# Patient Record
Sex: MALE | Race: WHITE | NOT HISPANIC OR LATINO | Employment: OTHER | URBAN - METROPOLITAN AREA
[De-identification: names, ages, dates, MRNs, and addresses within clinical notes are randomized per-mention and may not be internally consistent; named-entity substitution may affect disease eponyms.]

---

## 2023-11-05 DIAGNOSIS — B35.4 TINEA CORPORIS: ICD-10-CM

## 2023-11-07 PROBLEM — E11.9 DIABETES MELLITUS (MULTI): Status: ACTIVE | Noted: 2023-11-07

## 2023-11-07 PROBLEM — I10 BENIGN ESSENTIAL HYPERTENSION: Status: ACTIVE | Noted: 2023-11-07

## 2023-11-07 PROBLEM — M10.9 GOUT: Status: ACTIVE | Noted: 2023-11-07

## 2023-11-07 PROBLEM — N25.81 HYPERPARATHYROIDISM, SECONDARY RENAL (MULTI): Status: ACTIVE | Noted: 2023-11-07

## 2023-11-07 PROBLEM — B35.4 TINEA CORPORIS: Status: ACTIVE | Noted: 2023-11-07

## 2023-11-07 PROBLEM — E04.1 THYROID NODULE: Status: ACTIVE | Noted: 2023-11-07

## 2023-11-07 PROBLEM — N18.30 CKD (CHRONIC KIDNEY DISEASE), STAGE III (MULTI): Status: ACTIVE | Noted: 2023-11-07

## 2023-11-07 PROBLEM — R07.89 ATYPICAL CHEST PAIN: Status: ACTIVE | Noted: 2023-11-07

## 2023-11-07 PROBLEM — E78.00 ELEVATED LDL CHOLESTEROL LEVEL: Status: ACTIVE | Noted: 2023-11-07

## 2023-11-07 PROBLEM — M54.50 LUMBAGO: Status: ACTIVE | Noted: 2023-11-07

## 2023-11-07 RX ORDER — AMLODIPINE AND BENAZEPRIL HYDROCHLORIDE 10; 20 MG/1; MG/1
1 CAPSULE ORAL DAILY
COMMUNITY
End: 2024-01-12

## 2023-11-07 RX ORDER — ATORVASTATIN CALCIUM 20 MG/1
1 TABLET, FILM COATED ORAL DAILY
COMMUNITY
Start: 2012-10-22 | End: 2024-02-28 | Stop reason: SDUPTHER

## 2023-11-07 RX ORDER — PARICALCITOL 1 UG/1
CAPSULE, LIQUID FILLED ORAL
COMMUNITY
Start: 2021-10-25

## 2023-11-07 RX ORDER — BENAZEPRIL HYDROCHLORIDE 40 MG/1
TABLET ORAL
COMMUNITY
Start: 2023-09-25

## 2023-11-07 RX ORDER — SILVER SULFADIAZINE 10 G/1000G
CREAM TOPICAL
COMMUNITY
Start: 2023-03-06

## 2023-11-07 RX ORDER — NYSTATIN 100000 U/G
CREAM TOPICAL
COMMUNITY
Start: 2023-10-08 | End: 2023-12-06 | Stop reason: WASHOUT

## 2023-11-07 RX ORDER — CHOLECALCIFEROL (VITAMIN D3) 50 MCG
1 TABLET ORAL DAILY
COMMUNITY
Start: 2018-10-24

## 2023-11-07 RX ORDER — IBUPROFEN 100 MG/5ML
1 SUSPENSION, ORAL (FINAL DOSE FORM) ORAL DAILY
COMMUNITY
Start: 2018-10-24

## 2023-11-07 RX ORDER — ALLOPURINOL 300 MG/1
1 TABLET ORAL DAILY
COMMUNITY
Start: 2013-04-29

## 2023-11-07 RX ORDER — TERAZOSIN 10 MG/1
CAPSULE ORAL
COMMUNITY
Start: 2023-10-19

## 2023-11-07 RX ORDER — TORSEMIDE 20 MG/1
TABLET ORAL
COMMUNITY
Start: 2023-10-10 | End: 2024-06-05 | Stop reason: SDUPTHER

## 2023-11-08 RX ORDER — NYSTATIN 100000 U/G
CREAM TOPICAL
Qty: 30 G | Refills: 3 | Status: SHIPPED | OUTPATIENT
Start: 2023-11-08

## 2023-12-06 ENCOUNTER — OFFICE VISIT (OUTPATIENT)
Dept: PRIMARY CARE | Facility: CLINIC | Age: 78
End: 2023-12-06
Payer: MEDICARE

## 2023-12-06 VITALS
HEIGHT: 71 IN | DIASTOLIC BLOOD PRESSURE: 80 MMHG | TEMPERATURE: 98 F | BODY MASS INDEX: 35 KG/M2 | HEART RATE: 78 BPM | WEIGHT: 250 LBS | SYSTOLIC BLOOD PRESSURE: 137 MMHG

## 2023-12-06 DIAGNOSIS — N18.30 TYPE 2 DIABETES MELLITUS WITH STAGE 3 CHRONIC KIDNEY DISEASE, WITHOUT LONG-TERM CURRENT USE OF INSULIN, UNSPECIFIED WHETHER STAGE 3A OR 3B CKD (MULTI): Primary | ICD-10-CM

## 2023-12-06 DIAGNOSIS — I10 BENIGN ESSENTIAL HYPERTENSION: ICD-10-CM

## 2023-12-06 DIAGNOSIS — M10.9 GOUT, UNSPECIFIED CAUSE, UNSPECIFIED CHRONICITY, UNSPECIFIED SITE: ICD-10-CM

## 2023-12-06 DIAGNOSIS — E78.00 ELEVATED LDL CHOLESTEROL LEVEL: ICD-10-CM

## 2023-12-06 DIAGNOSIS — Z23 ENCOUNTER FOR IMMUNIZATION: ICD-10-CM

## 2023-12-06 DIAGNOSIS — E11.22 TYPE 2 DIABETES MELLITUS WITH STAGE 3 CHRONIC KIDNEY DISEASE, WITHOUT LONG-TERM CURRENT USE OF INSULIN, UNSPECIFIED WHETHER STAGE 3A OR 3B CKD (MULTI): Primary | ICD-10-CM

## 2023-12-06 DIAGNOSIS — N18.31 STAGE 3A CHRONIC KIDNEY DISEASE (MULTI): ICD-10-CM

## 2023-12-06 PROBLEM — K63.5 COLON POLYPS: Status: ACTIVE | Noted: 2023-12-06

## 2023-12-06 PROBLEM — M19.90 ARTHRITIS: Status: ACTIVE | Noted: 2023-12-06

## 2023-12-06 PROBLEM — K82.9 DISORDER OF GALLBLADDER: Status: ACTIVE | Noted: 2023-12-06

## 2023-12-06 PROBLEM — S83.92XA LEFT KNEE SPRAIN: Status: ACTIVE | Noted: 2023-12-06

## 2023-12-06 PROBLEM — D64.9 ANEMIA: Status: ACTIVE | Noted: 2023-12-06

## 2023-12-06 PROBLEM — I49.9 IRREGULAR HEART RHYTHM: Status: ACTIVE | Noted: 2023-12-06

## 2023-12-06 PROCEDURE — 1036F TOBACCO NON-USER: CPT | Performed by: INTERNAL MEDICINE

## 2023-12-06 PROCEDURE — 3079F DIAST BP 80-89 MM HG: CPT | Performed by: INTERNAL MEDICINE

## 2023-12-06 PROCEDURE — 1160F RVW MEDS BY RX/DR IN RCRD: CPT | Performed by: INTERNAL MEDICINE

## 2023-12-06 PROCEDURE — 3075F SYST BP GE 130 - 139MM HG: CPT | Performed by: INTERNAL MEDICINE

## 2023-12-06 PROCEDURE — 90677 PCV20 VACCINE IM: CPT | Performed by: INTERNAL MEDICINE

## 2023-12-06 PROCEDURE — 99213 OFFICE O/P EST LOW 20 MIN: CPT | Performed by: INTERNAL MEDICINE

## 2023-12-06 PROCEDURE — G0009 ADMIN PNEUMOCOCCAL VACCINE: HCPCS | Performed by: INTERNAL MEDICINE

## 2023-12-06 PROCEDURE — 1159F MED LIST DOCD IN RCRD: CPT | Performed by: INTERNAL MEDICINE

## 2023-12-06 ASSESSMENT — ENCOUNTER SYMPTOMS
DYSURIA: 0
LOSS OF SENSATION IN FEET: 0
DIZZINESS: 0
DIARRHEA: 0
CONFUSION: 0
VOMITING: 0
SHORTNESS OF BREATH: 0
OCCASIONAL FEELINGS OF UNSTEADINESS: 1
AGITATION: 0
COUGH: 0
FEVER: 0
SORE THROAT: 0
LIGHT-HEADEDNESS: 0
CHILLS: 0
ABDOMINAL PAIN: 0
NAUSEA: 0
DEPRESSION: 0

## 2023-12-06 ASSESSMENT — PATIENT HEALTH QUESTIONNAIRE - PHQ9
1. LITTLE INTEREST OR PLEASURE IN DOING THINGS: NOT AT ALL
2. FEELING DOWN, DEPRESSED OR HOPELESS: NOT AT ALL
SUM OF ALL RESPONSES TO PHQ9 QUESTIONS 1 AND 2: 0

## 2023-12-06 NOTE — PROGRESS NOTES
"Subjective   Patient ID: Rajeev Gonzalez Jr. is a 78 y.o. male who presents for Establish Care.    HPI Patient sees Dr. Lozada kidney specialist for management of CKD stage 3. Patient sees an eye Doctor once a year and reports no history of diabetic retinopathy.  Patient due for blood work.  States he has blood work ordered by nephrologist.  Patient up-to-date on age and gender recommended vaccinations with exception of a pneumonia vaccine which will be given today.  We also discussed that he will be due for colonoscopy and he should contact his gastroenterologist Dr. Abdifatah Chau to discuss this further.  Patient states he does have contact information for his GI doctor as his wife also sees the same doctor.  Patient denies any fever, chills, chest pain or shortness of breath, nausea, vomit diarrhea, abdominal pain.    Review of Systems   Constitutional:  Negative for chills and fever.   HENT:  Negative for sore throat.    Eyes:  Negative for visual disturbance.   Respiratory:  Negative for cough and shortness of breath.    Cardiovascular:  Positive for leg swelling (chronic stable). Negative for chest pain.   Gastrointestinal:  Negative for abdominal pain, diarrhea, nausea and vomiting.   Genitourinary:  Negative for dysuria.   Skin:  Negative for rash.   Neurological:  Negative for dizziness, syncope and light-headedness.   Psychiatric/Behavioral:  Negative for agitation and confusion.        Objective   /80   Pulse 78   Temp 36.7 °C (98 °F)   Ht 1.803 m (5' 11\")   Wt 113 kg (250 lb)   BMI 34.87 kg/m²     Physical Exam  Vitals and nursing note reviewed.   Constitutional:       General: He is not in acute distress.     Appearance: Normal appearance. He is obese. He is not ill-appearing, toxic-appearing or diaphoretic.   HENT:      Head: Normocephalic and atraumatic.      Mouth/Throat:      Mouth: Mucous membranes are moist.      Pharynx: Oropharynx is clear. No oropharyngeal exudate.   Eyes:      Pupils: " Pupils are equal, round, and reactive to light.   Cardiovascular:      Rate and Rhythm: Normal rate and regular rhythm.      Heart sounds: Normal heart sounds.   Pulmonary:      Effort: Pulmonary effort is normal. No respiratory distress.      Breath sounds: Normal breath sounds. No wheezing or rales.   Abdominal:      General: Bowel sounds are normal. There is no distension.      Palpations: Abdomen is soft. There is no mass.      Tenderness: There is no abdominal tenderness.   Musculoskeletal:      Cervical back: Neck supple. No tenderness.   Skin:     General: Skin is warm and dry.   Neurological:      General: No focal deficit present.      Mental Status: He is alert and oriented to person, place, and time.   Psychiatric:         Mood and Affect: Mood normal.         Behavior: Behavior normal.         Thought Content: Thought content normal.         Judgment: Judgment normal.         Assessment/Plan   Problem List Items Addressed This Visit             ICD-10-CM    Benign essential hypertension I10    CKD (chronic kidney disease), stage III (CMS/[facility]) N18.30    Relevant Orders    Vitamin D 25-Hydroxy,Total (for eval of Vitamin D levels)    Diabetes mellitus (CMS/[facility]) - Primary E11.9    Relevant Orders    Comprehensive metabolic panel    Hemoglobin A1C    Elevated LDL cholesterol level E78.00    Relevant Orders    Lipid panel    Gout M10.9    Relevant Orders    Uric acid    Encounter for immunization Z23    Relevant Orders    Pneumococcal conjugate vaccine, 20-valent (PREVNAR 20)     Diabetes mellitus: Chronic, stable the patient's last hemoglobin A1c was reviewed today with him and it was 6.2% from May 22, 2023.  We will repeat hemoglobin A1c and CMP at this time.  Currently managed by dietary and exercise patient currently not on diabetes medication    CKD stage III: Chronic, stable patient following with nephrology.  States that he has labs to do for his nephrologist as well.     Hyperlipidemia: Chronic, stable  we will recheck a lipid panel    Gout: We will recheck uric acid continue allopurinol    Encounter for immunization: We will give the patient the pneumonia vaccine Prevnar 20 today    Hypertension: Chronic, stable continue amlodipine benazepril

## 2024-01-09 DIAGNOSIS — I10 ESSENTIAL (PRIMARY) HYPERTENSION: ICD-10-CM

## 2024-01-12 RX ORDER — AMLODIPINE AND BENAZEPRIL HYDROCHLORIDE 10; 20 MG/1; MG/1
1 CAPSULE ORAL DAILY
Qty: 90 CAPSULE | Refills: 1 | Status: SHIPPED | OUTPATIENT
Start: 2024-01-12 | End: 2024-06-05 | Stop reason: ALTCHOICE

## 2024-02-28 DIAGNOSIS — E78.00 ELEVATED LDL CHOLESTEROL LEVEL: ICD-10-CM

## 2024-03-05 RX ORDER — ATORVASTATIN CALCIUM 20 MG/1
20 TABLET, FILM COATED ORAL DAILY
Qty: 90 TABLET | Refills: 3 | Status: SHIPPED | OUTPATIENT
Start: 2024-03-05

## 2024-06-04 PROBLEM — M62.81 QUADRICEPS WEAKNESS: Status: ACTIVE | Noted: 2024-06-04

## 2024-06-04 PROBLEM — R55 SYNCOPE: Status: ACTIVE | Noted: 2024-06-04

## 2024-06-04 PROBLEM — H90.3 SENSORINEURAL HEARING LOSS (SNHL) OF BOTH EARS: Status: ACTIVE | Noted: 2024-06-04

## 2024-06-04 PROBLEM — I50.9 CONGESTIVE HEART FAILURE (MULTI): Status: ACTIVE | Noted: 2024-06-04

## 2024-06-04 PROBLEM — E55.9 VITAMIN D DEFICIENCY: Status: ACTIVE | Noted: 2024-06-04

## 2024-06-04 PROBLEM — E66.9 OBESITY: Status: ACTIVE | Noted: 2024-06-04

## 2024-06-04 PROBLEM — M21.379 FOOT DROP: Status: ACTIVE | Noted: 2024-06-04

## 2024-06-04 PROBLEM — I87.2 PERIPHERAL VENOUS INSUFFICIENCY: Status: ACTIVE | Noted: 2024-06-04

## 2024-06-04 PROBLEM — L60.1 ONYCHOLYSIS: Status: ACTIVE | Noted: 2024-06-04

## 2024-06-04 PROBLEM — R26.89 ANTALGIC GAIT: Status: ACTIVE | Noted: 2024-06-04
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
## 2024-06-04 PROBLEM — L97.909 CHRONIC SKIN ULCER OF LOWER LEG (MULTI): Status: ACTIVE | Noted: 2024-06-04

## 2024-06-04 PROBLEM — M79.676 PAIN OF TOE: Status: ACTIVE | Noted: 2024-06-04

## 2024-06-05 ENCOUNTER — OFFICE VISIT (OUTPATIENT)
Dept: PRIMARY CARE | Facility: CLINIC | Age: 79
End: 2024-06-05
Payer: MEDICARE

## 2024-06-05 VITALS
HEIGHT: 71 IN | DIASTOLIC BLOOD PRESSURE: 81 MMHG | HEART RATE: 69 BPM | TEMPERATURE: 98 F | SYSTOLIC BLOOD PRESSURE: 151 MMHG | BODY MASS INDEX: 34.02 KG/M2 | WEIGHT: 243 LBS

## 2024-06-05 DIAGNOSIS — R60.9 EDEMA, UNSPECIFIED TYPE: ICD-10-CM

## 2024-06-05 DIAGNOSIS — E11.9 TYPE 2 DIABETES MELLITUS WITHOUT COMPLICATION, WITHOUT LONG-TERM CURRENT USE OF INSULIN (MULTI): ICD-10-CM

## 2024-06-05 DIAGNOSIS — I10 BENIGN ESSENTIAL HYPERTENSION: Primary | ICD-10-CM

## 2024-06-05 DIAGNOSIS — E78.2 MIXED HYPERLIPIDEMIA: ICD-10-CM

## 2024-06-05 DIAGNOSIS — I87.2 PERIPHERAL VENOUS INSUFFICIENCY: ICD-10-CM

## 2024-06-05 DIAGNOSIS — M1A.9XX0 CHRONIC GOUT WITHOUT TOPHUS, UNSPECIFIED CAUSE, UNSPECIFIED SITE: ICD-10-CM

## 2024-06-05 DIAGNOSIS — E55.9 VITAMIN D DEFICIENCY: ICD-10-CM

## 2024-06-05 DIAGNOSIS — N18.31 STAGE 3A CHRONIC KIDNEY DISEASE (MULTI): ICD-10-CM

## 2024-06-05 PROCEDURE — 3079F DIAST BP 80-89 MM HG: CPT | Performed by: INTERNAL MEDICINE

## 2024-06-05 PROCEDURE — 1160F RVW MEDS BY RX/DR IN RCRD: CPT | Performed by: INTERNAL MEDICINE

## 2024-06-05 PROCEDURE — 1036F TOBACCO NON-USER: CPT | Performed by: INTERNAL MEDICINE

## 2024-06-05 PROCEDURE — 3077F SYST BP >= 140 MM HG: CPT | Performed by: INTERNAL MEDICINE

## 2024-06-05 PROCEDURE — 99214 OFFICE O/P EST MOD 30 MIN: CPT | Performed by: INTERNAL MEDICINE

## 2024-06-05 PROCEDURE — 1159F MED LIST DOCD IN RCRD: CPT | Performed by: INTERNAL MEDICINE

## 2024-06-05 RX ORDER — TORSEMIDE 20 MG/1
20 TABLET ORAL DAILY
Qty: 90 TABLET | Refills: 1 | Status: SHIPPED | OUTPATIENT
Start: 2024-06-05 | End: 2024-12-02

## 2024-06-05 ASSESSMENT — ENCOUNTER SYMPTOMS
COUGH: 0
ABDOMINAL PAIN: 0
SORE THROAT: 0
FEVER: 0
LIGHT-HEADEDNESS: 0
SHORTNESS OF BREATH: 0
CHILLS: 0
DIZZINESS: 0
DIARRHEA: 0
VOMITING: 0
BLOOD IN STOOL: 0
DYSURIA: 0
NAUSEA: 0
PALPITATIONS: 0

## 2024-06-05 NOTE — PROGRESS NOTES
"Subjective   Patient ID: Rajeev Gonzalez Jr. is a 79 y.o. male who presents for Follow-up (6 month ) and Med Refill (Torsemide 20mg ).    HPI Patient does not check bp at home.  Blood pressure elevated in the office today last week his blood pressure was normal at his nephrologist appointment Which was reviewed here in the office today blood pressure at that appointment was 128/74.  Patient tolerating his medications well he is here for refills on torsemide.  Diet controlled diabetes.  He is due for blood work last blood work was in December and reviewed with him today in the office.  Last hemoglobin A1c was 6.4%.  I did review his office note with nephrology from 5/28/2024.  Patient also has a history of gout on allopurinol.  Recommend low-sodium diet for hypertension he wears compression stockings for chronic venous insufficiency.  His exemplar medication was discontinued by nephrology.    Review of Systems   Constitutional:  Negative for chills and fever.   HENT:  Negative for sore throat.    Eyes:  Negative for visual disturbance.   Respiratory:  Negative for cough and shortness of breath.    Cardiovascular:  Positive for leg swelling. Negative for chest pain and palpitations.   Gastrointestinal:  Negative for abdominal pain, blood in stool, diarrhea, nausea and vomiting.   Genitourinary:  Negative for dysuria.   Skin:  Negative for rash.   Neurological:  Negative for dizziness, syncope and light-headedness.   Psychiatric/Behavioral:  Negative for behavioral problems.        Objective   /81   Pulse 69   Temp 36.7 °C (98 °F) (Temporal)   Ht 1.803 m (5' 11\")   Wt 110 kg (243 lb)   BMI 33.89 kg/m²     Physical Exam  Vitals and nursing note reviewed.   Constitutional:       General: He is not in acute distress.     Appearance: Normal appearance. He is obese. He is not ill-appearing, toxic-appearing or diaphoretic.   HENT:      Head: Normocephalic and atraumatic.      Mouth/Throat:      Mouth: Mucous " membranes are moist.      Pharynx: Oropharynx is clear. No oropharyngeal exudate.   Eyes:      Extraocular Movements: Extraocular movements intact.      Pupils: Pupils are equal, round, and reactive to light.   Cardiovascular:      Rate and Rhythm: Normal rate and regular rhythm.      Heart sounds: Normal heart sounds.   Pulmonary:      Effort: Pulmonary effort is normal. No respiratory distress.      Breath sounds: Normal breath sounds. No wheezing or rales.   Abdominal:      General: There is no distension.      Palpations: Abdomen is soft.      Tenderness: There is no abdominal tenderness.   Musculoskeletal:      Cervical back: Neck supple. No tenderness.      Right lower leg: Edema present.      Left lower leg: Edema present.   Skin:     General: Skin is warm and dry.      Findings: No rash.   Neurological:      General: No focal deficit present.      Mental Status: He is alert. Mental status is at baseline.   Psychiatric:         Mood and Affect: Mood normal.         Behavior: Behavior normal.         Thought Content: Thought content normal.         Judgment: Judgment normal.         Assessment/Plan   Problem List Items Addressed This Visit             ICD-10-CM    Benign essential hypertension - Primary I10    Relevant Medications    torsemide (Demadex) 20 mg tablet    Other Relevant Orders    CBC and Auto Differential    Comprehensive metabolic panel    Stage 3 chronic kidney disease (Multi) N18.30    Relevant Orders    Uric acid    Diabetes mellitus (Multi) E11.9    Relevant Orders    Hemoglobin A1C    Gout M10.9    Peripheral venous insufficiency I87.2    Vitamin D deficiency E55.9    Relevant Orders    Vitamin D 25-Hydroxy,Total (for eval of Vitamin D levels)    Edema R60.9    Relevant Medications    torsemide (Demadex) 20 mg tablet    Other Relevant Orders    CBC and Auto Differential    Comprehensive metabolic panel    Mixed hyperlipidemia E78.2    Relevant Orders    Lipid panel     Hypertension:  Chronic, stable continue torsemide refills provided today he will also continue on losartan and Hytrin    Stage III chronic kidney disease: Chronic, stable refilled his medicine for torsemide is on max dose of benazepril we will continue follow-up with kidney specialist will repeat labs at this time    Diabetes mellitus: Chronic, stable diet and exercise modification recommended currently not on medication.  Will check a hemoglobin A1c    Gout: Chronic, stable continue allopurinol    Peripheral venous insufficiency: Chronic, stable continue compression stockings    Vitamin D deficiency: We will check a vitamin D level    Hyperlipidemia: Chronic, stable continue atorvastatin 20 mg at bedtime

## 2024-07-18 ENCOUNTER — OFFICE VISIT (OUTPATIENT)
Dept: PRIMARY CARE | Facility: CLINIC | Age: 79
End: 2024-07-18
Payer: MEDICARE

## 2024-07-18 VITALS
DIASTOLIC BLOOD PRESSURE: 80 MMHG | HEART RATE: 76 BPM | OXYGEN SATURATION: 97 % | HEIGHT: 71 IN | WEIGHT: 242 LBS | SYSTOLIC BLOOD PRESSURE: 142 MMHG | BODY MASS INDEX: 33.88 KG/M2

## 2024-07-18 DIAGNOSIS — L03.221 CELLULITIS, NECK: ICD-10-CM

## 2024-07-18 DIAGNOSIS — L08.9 INFECTED CYST OF SKIN: Primary | ICD-10-CM

## 2024-07-18 DIAGNOSIS — L72.9 INFECTED CYST OF SKIN: Primary | ICD-10-CM

## 2024-07-18 DIAGNOSIS — B35.4 TINEA CORPORIS: ICD-10-CM

## 2024-07-18 DIAGNOSIS — I10 BENIGN ESSENTIAL HYPERTENSION: ICD-10-CM

## 2024-07-18 PROCEDURE — 1159F MED LIST DOCD IN RCRD: CPT | Performed by: INTERNAL MEDICINE

## 2024-07-18 PROCEDURE — 1036F TOBACCO NON-USER: CPT | Performed by: INTERNAL MEDICINE

## 2024-07-18 PROCEDURE — 1123F ACP DISCUSS/DSCN MKR DOCD: CPT | Performed by: INTERNAL MEDICINE

## 2024-07-18 PROCEDURE — 1158F ADVNC CARE PLAN TLK DOCD: CPT | Performed by: INTERNAL MEDICINE

## 2024-07-18 PROCEDURE — 99213 OFFICE O/P EST LOW 20 MIN: CPT | Performed by: INTERNAL MEDICINE

## 2024-07-18 PROCEDURE — 3079F DIAST BP 80-89 MM HG: CPT | Performed by: INTERNAL MEDICINE

## 2024-07-18 PROCEDURE — 3077F SYST BP >= 140 MM HG: CPT | Performed by: INTERNAL MEDICINE

## 2024-07-18 RX ORDER — AMOXICILLIN AND CLAVULANATE POTASSIUM 875; 125 MG/1; MG/1
875 TABLET, FILM COATED ORAL 2 TIMES DAILY
Qty: 20 TABLET | Refills: 0 | Status: SHIPPED | OUTPATIENT
Start: 2024-07-18 | End: 2024-07-28

## 2024-07-18 ASSESSMENT — ENCOUNTER SYMPTOMS
LOSS OF SENSATION IN FEET: 0
OCCASIONAL FEELINGS OF UNSTEADINESS: 0
DEPRESSION: 0

## 2024-07-18 NOTE — PROGRESS NOTES
"Internal Medicine Outpatient Visit  Chief Complaint   Patient presents with    Cyst     Cyst on back of neck has gotten bigger, its been small for years but last 2 days it grew. Hurts and sensitive to touch.        HPI: Rajeev Gonzalez Jr. is of 79 y.o. who is here for Internal Visit for the following issues:  Patient is seen my office today with the pain and sudden increase in the size of the cyst which was present on the back of the neck for several years.  He has no fever or chills.  Denies any chest pain or palpitation.  Has no respiratory symptoms.  Review of other systems are negative.    Past Surgical History:   Procedure Laterality Date    BACK SURGERY      CHOLECYSTECTOMY      HERNIA REPAIR      KNEE ARTHROPLASTY Bilateral        Family History   Problem Relation Name Age of Onset    No Known Problems Mother           Current Outpatient Medications on File Prior to Visit   Medication Sig Dispense Refill    allopurinol (Zyloprim) 300 mg tablet Take 1 tablet (300 mg) by mouth once daily.      ascorbic acid (Vitamin C) 1,000 mg tablet Take 1 tablet (1,000 mg) by mouth once daily.      atorvastatin (Lipitor) 20 mg tablet Take 1 tablet (20 mg) by mouth once daily. 90 tablet 3    benazepril (Lotensin) 40 mg tablet       cholecalciferol (Vitamin D-3) 50 MCG (2000 UT) tablet Take 1 tablet (2,000 Units) by mouth once daily.      nystatin (Mycostatin) cream APPLY A THIN LAYER TO AFFECTED AREA(S) AND RUB IN WELL TWICE DAILY AS NEEDED 30 g 3    paricalcitol (Zemplar) 1 mcg capsule Take by mouth.      silver sulfADIAZINE (Silvadene) 1 % cream APPLY TO AFFECTED AREA EVERY DAY AS DIRECTED      terazosin (Hytrin) 10 mg capsule       torsemide (Demadex) 20 mg tablet Take 1 tablet (20 mg) by mouth once daily. 90 tablet 1     No current facility-administered medications on file prior to visit.       Blood pressure 142/80, pulse 76, height 1.803 m (5' 11\"), weight 110 kg (242 lb), SpO2 97%.  Body mass index is 33.75 " kg/m².  General examination: Mild discomfort due to the pain  Eyes: There is no pallor or jaundice  Heart: On the back of the neck there is a cystic swelling about 2 cm into 1 cm in size.  It is tender.  Surrounding skin is also red.  Lungs: Clear to auscultation  CVS: Heart sounds are regular there is no gallop murmur  Legs: No edema    Assessment and plan:  1. Infected cyst of skin  Clinically looks like infectious sebaceous cyst.  Ultimately will need excision or at least drainage for the time being.  Surgical referral is given to the patient  - amoxicillin-pot clavulanate (Augmentin) 875-125 mg tablet; Take 1 tablet (875 mg) by mouth 2 times a day for 10 days.  Dispense: 20 tablet; Refill: 0  - Referral to General Surgery; Future    2. Cellulitis, neck  Augmentin is prescribed.  He is also advised to follow-up with Dr. Ashraf in 1 week  - amoxicillin-pot clavulanate (Augmentin) 875-125 mg tablet; Take 1 tablet (875 mg) by mouth 2 times a day for 10 days.  Dispense: 20 tablet; Refill: 0    3. Benign essential hypertension  Readings are high today.  He is advised to have a repeat blood pressure checkup with Dr. Ashraf in 1-2.

## 2024-07-19 RX ORDER — NYSTATIN 100000 U/G
CREAM TOPICAL
Qty: 30 G | Refills: 3 | Status: SHIPPED | OUTPATIENT
Start: 2024-07-19

## 2024-08-07 ENCOUNTER — APPOINTMENT (OUTPATIENT)
Dept: PRIMARY CARE | Facility: CLINIC | Age: 79
End: 2024-08-07
Payer: MEDICARE

## 2024-09-21 DIAGNOSIS — I10 BENIGN ESSENTIAL HYPERTENSION: ICD-10-CM

## 2024-09-21 DIAGNOSIS — R60.9 EDEMA, UNSPECIFIED TYPE: ICD-10-CM

## 2024-09-25 RX ORDER — TORSEMIDE 20 MG/1
20 TABLET ORAL DAILY
Qty: 90 TABLET | Refills: 1 | Status: SHIPPED | OUTPATIENT
Start: 2024-09-25

## 2024-10-28 DIAGNOSIS — M1A.9XX0 CHRONIC GOUT WITHOUT TOPHUS, UNSPECIFIED CAUSE, UNSPECIFIED SITE: ICD-10-CM

## 2024-10-30 RX ORDER — ALLOPURINOL 300 MG/1
300 TABLET ORAL DAILY
Qty: 90 TABLET | Refills: 1 | Status: SHIPPED | OUTPATIENT
Start: 2024-10-30

## 2024-11-11 ENCOUNTER — TELEPHONE (OUTPATIENT)
Dept: PRIMARY CARE | Facility: CLINIC | Age: 79
End: 2024-11-11
Payer: MEDICARE

## 2024-11-11 DIAGNOSIS — M1A.9XX0 CHRONIC GOUT WITHOUT TOPHUS, UNSPECIFIED CAUSE, UNSPECIFIED SITE: ICD-10-CM

## 2024-11-11 NOTE — TELEPHONE ENCOUNTER
Patient states that he received a letter form [facility] that they are no longer covering Allopurinol.  He wants to know if there is something else that can be rx'd?  He does have an appt on 12/5/2024 but he does not have enough to last him.

## 2024-11-15 RX ORDER — ALLOPURINOL 300 MG/1
300 TABLET ORAL DAILY
Qty: 90 TABLET | Refills: 3 | Status: SHIPPED | OUTPATIENT
Start: 2024-11-15

## 2024-12-05 ENCOUNTER — APPOINTMENT (OUTPATIENT)
Dept: PRIMARY CARE | Facility: CLINIC | Age: 79
End: 2024-12-05
Payer: MEDICARE

## 2024-12-30 LAB
NON-UH HIE ALB: 3.5 G/DL (ref 3.4–5)
NON-UH HIE BUN/CREAT RATIO: 16.7
NON-UH HIE BUN: 20 MG/DL (ref 9–23)
NON-UH HIE CALCIUM: 9.7 MG/DL (ref 8.7–10.4)
NON-UH HIE CALCULATED OSMOLALITY: 285 MOSM/KG (ref 275–295)
NON-UH HIE CHLORIDE: 106 MMOL/L (ref 98–107)
NON-UH HIE CO2, VENOUS: 30 MMOL/L (ref 20–31)
NON-UH HIE CORRECTED CALCIUM: 10.1 MG/DL (ref 8.5–10.5)
NON-UH HIE CREATININE, URINE MG/DL: 126.8 MG/DL
NON-UH HIE CREATININE: 1.2 MG/DL (ref 0.6–1.1)
NON-UH HIE GFR AA: >60
NON-UH HIE GLOMERULAR FILTRATION RATE: 58 ML/MIN/1.73M?
NON-UH HIE GLUCOSE: 117 MG/DL (ref 74–106)
NON-UH HIE HGB A1C: 6 %
NON-UH HIE K: 4.7 MMOL/L (ref 3.5–5.1)
NON-UH HIE MICROALBUMIN, URINE MG/L: 206 MG/L
NON-UH HIE MICROALBUMIN/CREATININE RATIO: 162 MG MALB/GM CREAT (ref 0–30)
NON-UH HIE NA: 141 MMOL/L (ref 135–145)
NON-UH HIE PHOSPHORUS: 2.9 MG/DL (ref 2–5.1)
NON-UH HIE URIC ACID: 4.7 MG/DL (ref 3.7–9.2)
NON-UH HIE VIT D 25: 37 NG/ML

## 2025-01-02 ENCOUNTER — APPOINTMENT (OUTPATIENT)
Dept: PRIMARY CARE | Facility: CLINIC | Age: 80
End: 2025-01-02
Payer: MEDICARE

## 2025-01-02 VITALS
HEIGHT: 71 IN | DIASTOLIC BLOOD PRESSURE: 74 MMHG | BODY MASS INDEX: 33.74 KG/M2 | WEIGHT: 241 LBS | TEMPERATURE: 97.9 F | HEART RATE: 90 BPM | SYSTOLIC BLOOD PRESSURE: 133 MMHG

## 2025-01-02 DIAGNOSIS — E11.22 TYPE 2 DIABETES MELLITUS WITH STAGE 3 CHRONIC KIDNEY DISEASE, WITHOUT LONG-TERM CURRENT USE OF INSULIN, UNSPECIFIED WHETHER STAGE 3A OR 3B CKD (MULTI): ICD-10-CM

## 2025-01-02 DIAGNOSIS — M1A.9XX0 CHRONIC GOUT WITHOUT TOPHUS, UNSPECIFIED CAUSE, UNSPECIFIED SITE: ICD-10-CM

## 2025-01-02 DIAGNOSIS — I10 BENIGN ESSENTIAL HYPERTENSION: ICD-10-CM

## 2025-01-02 DIAGNOSIS — N18.30 TYPE 2 DIABETES MELLITUS WITH STAGE 3 CHRONIC KIDNEY DISEASE, WITHOUT LONG-TERM CURRENT USE OF INSULIN, UNSPECIFIED WHETHER STAGE 3A OR 3B CKD (MULTI): ICD-10-CM

## 2025-01-02 DIAGNOSIS — N18.31 STAGE 3A CHRONIC KIDNEY DISEASE (MULTI): ICD-10-CM

## 2025-01-02 DIAGNOSIS — B35.4 TINEA CORPORIS: ICD-10-CM

## 2025-01-02 DIAGNOSIS — I87.2 PERIPHERAL VENOUS INSUFFICIENCY: ICD-10-CM

## 2025-01-02 DIAGNOSIS — Z00.00 HEALTH MAINTENANCE EXAMINATION: Primary | ICD-10-CM

## 2025-01-02 DIAGNOSIS — E55.9 VITAMIN D DEFICIENCY: ICD-10-CM

## 2025-01-02 DIAGNOSIS — E78.2 MIXED HYPERLIPIDEMIA: ICD-10-CM

## 2025-01-02 PROBLEM — R55 SYNCOPE: Status: RESOLVED | Noted: 2024-06-04 | Resolved: 2025-01-02

## 2025-01-02 PROBLEM — I49.9 IRREGULAR HEART RHYTHM: Status: RESOLVED | Noted: 2023-12-06 | Resolved: 2025-01-02

## 2025-01-02 PROBLEM — E66.9 OBESITY: Status: RESOLVED | Noted: 2024-06-04 | Resolved: 2025-01-02

## 2025-01-02 PROBLEM — E04.1 THYROID NODULE: Status: RESOLVED | Noted: 2023-11-07 | Resolved: 2025-01-02

## 2025-01-02 PROBLEM — E78.00 ELEVATED LOW DENSITY LIPOPROTEIN (LDL) CHOLESTEROL LEVEL: Status: RESOLVED | Noted: 2023-11-07 | Resolved: 2025-01-02

## 2025-01-02 PROBLEM — I50.9 CONGESTIVE HEART FAILURE: Status: RESOLVED | Noted: 2024-06-04 | Resolved: 2025-01-02

## 2025-01-02 PROBLEM — K82.9 DISORDER OF GALLBLADDER: Status: RESOLVED | Noted: 2023-12-06 | Resolved: 2025-01-02

## 2025-01-02 PROBLEM — S83.92XA LEFT KNEE SPRAIN: Status: RESOLVED | Noted: 2023-12-06 | Resolved: 2025-01-02

## 2025-01-02 PROBLEM — R07.89 ATYPICAL CHEST PAIN: Status: RESOLVED | Noted: 2023-11-07 | Resolved: 2025-01-02

## 2025-01-02 PROCEDURE — 1159F MED LIST DOCD IN RCRD: CPT | Performed by: INTERNAL MEDICINE

## 2025-01-02 PROCEDURE — 3078F DIAST BP <80 MM HG: CPT | Performed by: INTERNAL MEDICINE

## 2025-01-02 PROCEDURE — 1123F ACP DISCUSS/DSCN MKR DOCD: CPT | Performed by: INTERNAL MEDICINE

## 2025-01-02 PROCEDURE — 99214 OFFICE O/P EST MOD 30 MIN: CPT | Performed by: INTERNAL MEDICINE

## 2025-01-02 PROCEDURE — 1160F RVW MEDS BY RX/DR IN RCRD: CPT | Performed by: INTERNAL MEDICINE

## 2025-01-02 PROCEDURE — 3075F SYST BP GE 130 - 139MM HG: CPT | Performed by: INTERNAL MEDICINE

## 2025-01-02 PROCEDURE — 1170F FXNL STATUS ASSESSED: CPT | Performed by: INTERNAL MEDICINE

## 2025-01-02 PROCEDURE — G0438 PPPS, INITIAL VISIT: HCPCS | Performed by: INTERNAL MEDICINE

## 2025-01-02 PROCEDURE — 1036F TOBACCO NON-USER: CPT | Performed by: INTERNAL MEDICINE

## 2025-01-02 PROCEDURE — 1158F ADVNC CARE PLAN TLK DOCD: CPT | Performed by: INTERNAL MEDICINE

## 2025-01-02 PROCEDURE — 99397 PER PM REEVAL EST PAT 65+ YR: CPT | Performed by: INTERNAL MEDICINE

## 2025-01-02 RX ORDER — NYSTATIN 100000 U/G
CREAM TOPICAL 2 TIMES DAILY PRN
Qty: 30 G | Refills: 3 | Status: SHIPPED | OUTPATIENT
Start: 2025-01-02 | End: 2025-02-01

## 2025-01-02 RX ORDER — BISMUTH SUBSALICYLATE 262 MG
1 TABLET,CHEWABLE ORAL DAILY
COMMUNITY

## 2025-01-02 ASSESSMENT — ENCOUNTER SYMPTOMS
HEMATURIA: 0
CHILLS: 0
ABDOMINAL PAIN: 0
AGITATION: 0
PALPITATIONS: 0
COUGH: 0
DYSURIA: 0
CONFUSION: 0
VOMITING: 0
SHORTNESS OF BREATH: 0
DIZZINESS: 0
LIGHT-HEADEDNESS: 0
BLOOD IN STOOL: 0
NAUSEA: 0
SORE THROAT: 0
FEVER: 0
DIARRHEA: 0
CONSTIPATION: 0

## 2025-01-02 ASSESSMENT — PATIENT HEALTH QUESTIONNAIRE - PHQ9
1. LITTLE INTEREST OR PLEASURE IN DOING THINGS: NOT AT ALL
2. FEELING DOWN, DEPRESSED OR HOPELESS: NOT AT ALL
1. LITTLE INTEREST OR PLEASURE IN DOING THINGS: NOT AT ALL
2. FEELING DOWN, DEPRESSED OR HOPELESS: NOT AT ALL
SUM OF ALL RESPONSES TO PHQ9 QUESTIONS 1 AND 2: 0
SUM OF ALL RESPONSES TO PHQ9 QUESTIONS 1 AND 2: 0

## 2025-01-02 ASSESSMENT — ACTIVITIES OF DAILY LIVING (ADL)
BATHING: INDEPENDENT
DRESSING: INDEPENDENT
MANAGING_FINANCES: INDEPENDENT
DOING_HOUSEWORK: INDEPENDENT
TAKING_MEDICATION: INDEPENDENT
GROCERY_SHOPPING: INDEPENDENT

## 2025-01-02 NOTE — PROGRESS NOTES
Subjective   Reason for Visit: Rajeev Gonzalez Jr. is an 79 y.o. male here for a Medicare Wellness visit.     Past Medical, Surgical, and Family History reviewed and updated in chart.    Reviewed all medications by prescribing practitioner or clinical pharmacist (such as prescriptions, OTCs, herbal therapies and supplements) and documented in the medical record.    HPI patient is 79-year-old male presents to the office today for annual wellness visit.  Has past medical history of hypertension, stage III chronic kidney disease, diabetes mellitus, hyperlipidemia, hyperparathyroidism due to renal insufficiency, gout, chronic anemia, sensorineural hearing loss of both ears, vitamin D deficiency, peripheral venous insufficiency, obesity, foot drop, congestive heart failure.  Patient has cut back on sweets and has notived sugar has improved with hemoglobin A1c 6.0.     Patient following with nephrology Dr. Hendrickson for management of chronic kidney disease.  Unfortunately he did not take our labs with him to [facility] in terms of the lab orders to have them checked along with Dr. Hendrickson labs.  Therefore we are unable to get his CBC and lipid panel but had planned on checking this during his next visit in 6 months.  Patient up-to-date on age and gender recommended screening.  He is up-to-date on age and recommended vaccinations exception of RSV and COVID-19 which I recommend he get at his pharmacy.  Recommend to have diabetic eye exam once yearly.    Patient Care Team:  Rafal Ashraf DO as PCP - General  Rafal Ashraf DO as PCP - [employer] PCP     Review of Systems   Constitutional:  Negative for chills and fever.   HENT:  Negative for sore throat.    Eyes:  Negative for visual disturbance.   Respiratory:  Negative for cough and shortness of breath.    Cardiovascular:  Positive for leg swelling. Negative for chest pain and palpitations.   Gastrointestinal:  Negative for abdominal  "pain, blood in stool, constipation, diarrhea, nausea and vomiting.   Genitourinary:  Negative for dysuria and hematuria.   Skin:  Negative for rash.   Neurological:  Negative for dizziness, syncope and light-headedness.   Psychiatric/Behavioral:  Negative for agitation and confusion.        Objective   Vitals:  /74 (BP Location: Left arm, Patient Position: Sitting, BP Cuff Size: Adult)   Pulse 90   Temp 36.6 °C (97.9 °F)   Ht 1.803 m (5' 11\")   Wt 109 kg (241 lb)   BMI 33.61 kg/m²       Physical Exam  Vitals and nursing note reviewed.   Constitutional:       General: He is not in acute distress.     Appearance: Normal appearance. He is obese. He is not ill-appearing, toxic-appearing or diaphoretic.   HENT:      Head: Normocephalic and atraumatic.      Mouth/Throat:      Mouth: Mucous membranes are moist.      Pharynx: Oropharynx is clear. No oropharyngeal exudate.   Eyes:      Pupils: Pupils are equal, round, and reactive to light.   Cardiovascular:      Rate and Rhythm: Normal rate and regular rhythm.      Heart sounds: Normal heart sounds.   Pulmonary:      Effort: Pulmonary effort is normal. No respiratory distress.      Breath sounds: Normal breath sounds. No wheezing, rhonchi or rales.   Abdominal:      Palpations: Abdomen is soft.   Musculoskeletal:      Cervical back: Neck supple.      Right lower leg: No edema.      Left lower leg: No edema.   Lymphadenopathy:      Cervical: No cervical adenopathy.   Skin:     General: Skin is warm and dry.      Coloration: Skin is not jaundiced or pale.      Findings: No rash.   Neurological:      General: No focal deficit present.      Mental Status: He is alert and oriented to person, place, and time.      Cranial Nerves: No cranial nerve deficit.   Psychiatric:         Mood and Affect: Mood normal.         Behavior: Behavior normal.         Thought Content: Thought content normal.         Judgment: Judgment normal.         Assessment & Plan  Tinea " corporis    Orders:    nystatin (Mycostatin) cream; Apply topically 2 times a day as needed (rash).    Benign essential hypertension    Orders:    CBC and Auto Differential; Future    Comprehensive metabolic panel; Future    Stage 3a chronic kidney disease (Multi)    Orders:    CBC and Auto Differential; Future    Comprehensive metabolic panel; Future    Vitamin D deficiency    Orders:    Vitamin D 25-Hydroxy,Total (for eval of Vitamin D levels); Future    Mixed hyperlipidemia    Orders:    Lipid panel; Future    Type 2 diabetes mellitus with stage 3 chronic kidney disease, without long-term current use of insulin, unspecified whether stage 3a or 3b CKD (Multi)    Orders:    Hemoglobin A1C; Future    Chronic gout without tophus, unspecified cause, unspecified site    Orders:    Uric acid; Future    Health maintenance examination         Peripheral venous insufficiency            Health maintenance examination: Patient up-to-date on age and recommended screening.  Patient due for RSV and COVID-19 vaccinations which I recommend he get at his pharmacy    Tinea corporis: Chronic, stable refills provided for nystatin    Hypertension: Chronic, stable.  Continue benazepril and Hytrin and torsemide    Hyperlipidemia: Chronic, stable continue atorvastatin    Stage IIIa chronic kidney disease: Chronic, stable is following with nephrology he states yearly    Vitamin D deficiency: Chronic, stable continue vitamin D    Type 2 diabetes mellitus: Chronic, stable managed by diet and exercise.  Currently not on diabetes medications.    Chronic gout: Chronic, stable continue allopurinol uric acid level at goal    Peripheral venous insufficiency: Continue torsemide and compression wraps

## 2025-01-23 DIAGNOSIS — E78.00 ELEVATED LDL CHOLESTEROL LEVEL: ICD-10-CM

## 2025-01-27 RX ORDER — ATORVASTATIN CALCIUM 20 MG/1
20 TABLET, FILM COATED ORAL DAILY
Qty: 90 TABLET | Refills: 3 | Status: SHIPPED | OUTPATIENT
Start: 2025-01-27

## 2025-05-17 DIAGNOSIS — R60.9 EDEMA, UNSPECIFIED TYPE: ICD-10-CM

## 2025-05-17 DIAGNOSIS — I10 BENIGN ESSENTIAL HYPERTENSION: ICD-10-CM

## 2025-05-20 RX ORDER — TORSEMIDE 20 MG/1
20 TABLET ORAL DAILY
Qty: 90 TABLET | Refills: 1 | Status: SHIPPED | OUTPATIENT
Start: 2025-05-20

## 2025-07-02 LAB
NON-UH HIE A/G RATIO: 1.1
NON-UH HIE ALB: 3.7 G/DL (ref 3.4–5)
NON-UH HIE ALK PHOS: 87 UNIT/L (ref 45–117)
NON-UH HIE BASO COUNT: 0.07 X1000 (ref 0–0.2)
NON-UH HIE BASOS %: 0.8 %
NON-UH HIE BILIRUBIN, TOTAL: 1.2 MG/DL (ref 0.3–1.2)
NON-UH HIE BUN/CREAT RATIO: 16.2
NON-UH HIE BUN: 21 MG/DL (ref 9–23)
NON-UH HIE CALCIUM: 9.8 MG/DL (ref 8.7–10.4)
NON-UH HIE CALCULATED LDL CHOLESTEROL: 59 MG/DL (ref 60–130)
NON-UH HIE CALCULATED OSMOLALITY: 291 MOSM/KG (ref 275–295)
NON-UH HIE CHLORIDE: 106 MMOL/L (ref 98–107)
NON-UH HIE CHOLESTEROL: 122 MG/DL (ref 100–200)
NON-UH HIE CO2, VENOUS: 29 MMOL/L (ref 20–31)
NON-UH HIE CREATININE: 1.3 MG/DL (ref 0.6–1.1)
NON-UH HIE DIFF?: ABNORMAL
NON-UH HIE EOS COUNT: 0.44 X1000 (ref 0–0.5)
NON-UH HIE EOSIN %: 5.5 %
NON-UH HIE GFR AA: >60
NON-UH HIE GLOBULIN: 3.4 G/DL
NON-UH HIE GLOMERULAR FILTRATION RATE: 53 ML/MIN/1.73M?
NON-UH HIE GLUCOSE: 117 MG/DL (ref 74–106)
NON-UH HIE GOT: 28 UNIT/L (ref 15–37)
NON-UH HIE GPT: 31 UNIT/L (ref 10–49)
NON-UH HIE HCT: 39.9 % (ref 41–52)
NON-UH HIE HDL CHOLESTEROL: 48 MG/DL (ref 40–60)
NON-UH HIE HGB A1C: 6 %
NON-UH HIE HGB: 13.2 G/DL (ref 13.5–17.5)
NON-UH HIE INSTR WBC: 8
NON-UH HIE K: 4.6 MMOL/L (ref 3.5–5.1)
NON-UH HIE LYMPH %: 31 %
NON-UH HIE LYMPH COUNT: 2.47 X1000 (ref 1.2–4.8)
NON-UH HIE MCH: 31.3 PG (ref 27–34)
NON-UH HIE MCHC: 33 G/DL (ref 32–37)
NON-UH HIE MCV: 94.8 FL (ref 80–100)
NON-UH HIE MONO %: 6.6 %
NON-UH HIE MONO COUNT: 0.52 X1000 (ref 0.1–1)
NON-UH HIE MPV: 8.1 FL (ref 7.4–10.4)
NON-UH HIE NA: 144 MMOL/L (ref 135–145)
NON-UH HIE NEUTROPHIL %: 56 %
NON-UH HIE NEUTROPHIL COUNT (ANC): 4.46 X1000 (ref 1.4–8.8)
NON-UH HIE NUCLEATED RBC: 0 /100WBC
NON-UH HIE PLATELET: 186 X10 (ref 150–450)
NON-UH HIE RBC: 4.21 X10 (ref 4.7–6.1)
NON-UH HIE RDW: 15.9 % (ref 11.5–14.5)
NON-UH HIE TOTAL CHOL/HDL CHOL RATIO: 2.5
NON-UH HIE TOTAL PROTEIN: 7.1 G/DL (ref 5.7–8.2)
NON-UH HIE TRIGLYCERIDES: 75 MG/DL (ref 30–150)
NON-UH HIE URIC ACID: 4.7 MG/DL (ref 3.7–9.2)
NON-UH HIE VIT D 25: 41 NG/ML
NON-UH HIE WBC: 8 X10 (ref 4.5–11)

## 2025-07-07 ENCOUNTER — APPOINTMENT (OUTPATIENT)
Dept: PRIMARY CARE | Facility: CLINIC | Age: 80
End: 2025-07-07
Payer: MEDICARE

## 2025-07-07 VITALS
WEIGHT: 236.8 LBS | BODY MASS INDEX: 33.15 KG/M2 | HEIGHT: 71 IN | TEMPERATURE: 97.5 F | SYSTOLIC BLOOD PRESSURE: 128 MMHG | HEART RATE: 40 BPM | DIASTOLIC BLOOD PRESSURE: 47 MMHG

## 2025-07-07 DIAGNOSIS — E55.9 VITAMIN D DEFICIENCY: ICD-10-CM

## 2025-07-07 DIAGNOSIS — M1A.9XX0 CHRONIC GOUT WITHOUT TOPHUS, UNSPECIFIED CAUSE, UNSPECIFIED SITE: ICD-10-CM

## 2025-07-07 DIAGNOSIS — E78.2 MIXED HYPERLIPIDEMIA: ICD-10-CM

## 2025-07-07 DIAGNOSIS — R94.31 ABNORMAL ECG: ICD-10-CM

## 2025-07-07 DIAGNOSIS — I49.9 IRREGULAR HEART RHYTHM: Primary | ICD-10-CM

## 2025-07-07 DIAGNOSIS — E11.22 TYPE 2 DIABETES MELLITUS WITH STAGE 3 CHRONIC KIDNEY DISEASE, WITHOUT LONG-TERM CURRENT USE OF INSULIN, UNSPECIFIED WHETHER STAGE 3A OR 3B CKD (MULTI): ICD-10-CM

## 2025-07-07 DIAGNOSIS — I48.91 NEW ONSET A-FIB (MULTI): ICD-10-CM

## 2025-07-07 DIAGNOSIS — N18.31 STAGE 3A CHRONIC KIDNEY DISEASE (MULTI): ICD-10-CM

## 2025-07-07 DIAGNOSIS — N18.30 TYPE 2 DIABETES MELLITUS WITH STAGE 3 CHRONIC KIDNEY DISEASE, WITHOUT LONG-TERM CURRENT USE OF INSULIN, UNSPECIFIED WHETHER STAGE 3A OR 3B CKD (MULTI): ICD-10-CM

## 2025-07-07 DIAGNOSIS — R00.1 BRADYCARDIA: ICD-10-CM

## 2025-07-07 DIAGNOSIS — R07.9 CHEST PAIN, UNSPECIFIED TYPE: ICD-10-CM

## 2025-07-07 PROCEDURE — 3078F DIAST BP <80 MM HG: CPT | Performed by: INTERNAL MEDICINE

## 2025-07-07 PROCEDURE — 93000 ELECTROCARDIOGRAM COMPLETE: CPT | Performed by: INTERNAL MEDICINE

## 2025-07-07 PROCEDURE — 99214 OFFICE O/P EST MOD 30 MIN: CPT | Performed by: INTERNAL MEDICINE

## 2025-07-07 PROCEDURE — 1036F TOBACCO NON-USER: CPT | Performed by: INTERNAL MEDICINE

## 2025-07-07 PROCEDURE — 1159F MED LIST DOCD IN RCRD: CPT | Performed by: INTERNAL MEDICINE

## 2025-07-07 PROCEDURE — G2211 COMPLEX E/M VISIT ADD ON: HCPCS | Performed by: INTERNAL MEDICINE

## 2025-07-07 PROCEDURE — 3074F SYST BP LT 130 MM HG: CPT | Performed by: INTERNAL MEDICINE

## 2025-07-07 PROCEDURE — 1160F RVW MEDS BY RX/DR IN RCRD: CPT | Performed by: INTERNAL MEDICINE

## 2025-07-07 RX ORDER — NYSTATIN 100000 U/G
CREAM TOPICAL
COMMUNITY
Start: 2025-05-22

## 2025-07-07 ASSESSMENT — ENCOUNTER SYMPTOMS
CONSTIPATION: 0
CONFUSION: 0
BLOOD IN STOOL: 0
SORE THROAT: 0
DIZZINESS: 0
FEVER: 0
PALPITATIONS: 0
VOMITING: 0
DYSURIA: 0
FATIGUE: 1
SHORTNESS OF BREATH: 0
DIARRHEA: 0
NAUSEA: 0
AGITATION: 0
COUGH: 0
LIGHT-HEADEDNESS: 0
CHILLS: 0
ABDOMINAL PAIN: 0

## 2025-07-07 ASSESSMENT — PATIENT HEALTH QUESTIONNAIRE - PHQ9
2. FEELING DOWN, DEPRESSED OR HOPELESS: NOT AT ALL
1. LITTLE INTEREST OR PLEASURE IN DOING THINGS: NOT AT ALL
SUM OF ALL RESPONSES TO PHQ9 QUESTIONS 1 AND 2: 0

## 2025-07-07 NOTE — PROGRESS NOTES
Subjective   Patient ID: Rajeev Gonzalez Jr. is a 80 y.o. male who presents for Follow-up (6 month) and Results (labs).  History of Present Illness  Rajeev Gonzalez Jr. is an 80 year old male with high blood pressure and high cholesterol , chronic kidney disease, diet-controlled type 2 diabetes mellitus, chronic gout who presents with shortness of breath and chest tightness.  Patient also here to follow-up for 6-month follow-up exam today.  He is here to review lab work results.    He has been experiencing increased shortness of breath over the past few days, particularly after exertion, such as cleaning the kitchen. He describes the sensation as chest tightness rather than pain, which is uncomfortable and associated with mouth breathing and feeling out of breath. The chest discomfort, which occurred most frequently yesterday, lasted a few minutes and resolved with rest. No significant chest pain, lightheadedness, dizziness, or episodes of syncope. No recent fever or chills.    His daughter noticed swelling in his legs and ankles, although he does not believe it is more than usual. He denies any history of atrial fibrillation or congestive heart failure. He has no known history of heart problems, although his wife has a history of atrial fibrillation.    His current medications include a vitamin D supplement taken three times a week and a medication for gout. His vitamin D level was checked, and he takes a vitamin D supplement three times a week. His cholesterol was checked, with an LDL of 59, triglycerides of 75, and total cholesterol of 122. His fasting blood sugar is 117, and his A1c is 6.0, consistent with previous results. He has a history of gout, which is currently managed with medication.    He does not smoke.    Review of Systems   Constitutional:  Positive for fatigue. Negative for chills and fever.   HENT:  Negative for sore throat.    Eyes:  Negative for visual disturbance.   Respiratory:  Negative for  "cough and shortness of breath.    Cardiovascular:  Negative for chest pain, palpitations and leg swelling.   Gastrointestinal:  Negative for abdominal pain, blood in stool, constipation, diarrhea, nausea and vomiting.   Genitourinary:  Negative for dysuria.   Skin:  Negative for rash.   Neurological:  Negative for dizziness, syncope and light-headedness.   Psychiatric/Behavioral:  Negative for agitation and confusion.        Objective     BP (!) 128/47 (BP Location: Left arm, Patient Position: Sitting, BP Cuff Size: Adult)   Pulse (!) 40   Temp 36.4 °C (97.5 °F)   Ht 1.803 m (5' 11\")   Wt 107 kg (236 lb 12.8 oz)   BMI 33.03 kg/m²      Physical Exam  Vitals and nursing note reviewed.   Constitutional:       General: He is not in acute distress.     Appearance: Normal appearance. He is not ill-appearing, toxic-appearing or diaphoretic.   HENT:      Head: Normocephalic and atraumatic.      Mouth/Throat:      Mouth: Mucous membranes are moist.      Pharynx: Oropharynx is clear. No oropharyngeal exudate.   Eyes:      Pupils: Pupils are equal, round, and reactive to light.   Cardiovascular:      Rate and Rhythm: Normal rate. Rhythm irregular.      Heart sounds: Normal heart sounds. No murmur heard.  Pulmonary:      Effort: Pulmonary effort is normal. No respiratory distress.      Breath sounds: Normal breath sounds. No wheezing, rhonchi or rales.   Abdominal:      General: There is no distension.      Palpations: Abdomen is soft.      Tenderness: There is no abdominal tenderness. There is no guarding.   Musculoskeletal:      Cervical back: Neck supple.      Right lower leg: No edema.      Left lower leg: No edema.   Lymphadenopathy:      Cervical: No cervical adenopathy.   Skin:     General: Skin is warm and dry.      Coloration: Skin is not jaundiced or pale.      Findings: No rash.   Neurological:      General: No focal deficit present.      Mental Status: He is alert and oriented to person, place, and time.      " Cranial Nerves: No cranial nerve deficit.   Psychiatric:         Mood and Affect: Mood normal.         Behavior: Behavior normal.         Thought Content: Thought content normal.         Judgment: Judgment normal.        Physical Exam  CHEST: Lungs clear to auscultation.  ABDOMEN: Abdomen non-tender.    Assessment & Plan  Chest Discomfort and Dyspnea  Intermittent chest tightness and dyspnea over the past few days, particularly post-exertion. Symptoms include chest pressure without pain, resolving with rest. Differential diagnosis includes atrial fibrillation, heart failure, or other cardiac issues. EKG shows irregular heart rhythm, possibly atrial fibrillation, but not definitive. No history of atrial fibrillation or congestive heart failure. No current chest pain or dyspnea.  - Refer to cardiologist, Dr. Moreira, for further evaluation.  - Advise to seek emergency care if chest pain or dyspnea occurs.  - Consider starting metoprolol 25 mg and anticoagulation if atrial fibrillation is confirmed.  - Order thyroid and magnesium levels to rule out arrhythmia causes.    Addendum 5:56 PM on 7/7/2025: I spoke with Dr. Moreira whom the patient is requesting to establish care with.  He did review the EKG and agrees with me and my colleagues assessment of atrial fibrillation and recommended we will start the patient on anticoagulation with eliquis given his [id number] score of 4.  Patient's rate is currently controlled.  Dr. Moreira also recommend a Holter monitor and for the patient to follow-up with him.    Hypertension  Blood pressure is well-controlled.    Hyperlipidemia: Chronic, stable he will continue the current medication regimen of atorvastatin    Type 2 diabetes mellitus: Chronic, stable managed without medication at this time    Chronic Kidney Disease  Kidney function is well-managed with creatinine at 1.3.  - He continues following with a nephrologist    Gout  Uric acid level is 4.7, indicating well-controlled  gout. No acute flare-ups reported.    General Health Maintenance  Vitamin D level is normal with current supplementation. Cholesterol levels are well-controlled with LDL at 59, triglycerides at 75, and total cholesterol at 122. Fasting blood sugar is 117, and A1c is 6.0, indicating good glycemic control. Slight anemia noted but improved from previous levels.  - Continue current vitamin D supplementation regimen.  - Continue monitoring cholesterol and blood sugar levels.  - Order routine blood work in six months.    Results  LABS  LDL: 59 mg/dL (07/2025)  Triglycerides: 75 mg/dL (07/2025)  Total cholesterol: 122 mg/dL (07/2025)  Fasting glucose: 117 mg/dL (07/2025)  Creatinine: 1.3 mg/dL (07/2025)  Uric acid: 4.7 mg/dL (07/2025)  HbA1c: 6.0% (07/2025)  RBC: Slightly decreased (07/2025)    DIAGNOSTIC  EKG: Normal (07/2025)    Problem List Items Addressed This Visit       Stage 3 chronic kidney disease (Multi)    Relevant Orders    Comprehensive Metabolic Panel    CBC    Diabetes mellitus (Multi)    Relevant Orders    Comprehensive Metabolic Panel    CBC    Hemoglobin A1C    Gout    Relevant Orders    Uric acid    Vitamin D deficiency    Relevant Orders    Vitamin D 25-Hydroxy,Total (for eval of Vitamin D levels)    Mixed hyperlipidemia    Relevant Orders    Lipid Panel    Chest pain    Relevant Orders    Referral to Cardiology    Abnormal ECG    Relevant Orders    Referral to Cardiology    TSH with reflex to Free T4 if abnormal    Magnesium    Bradycardia    Relevant Orders    ECG 12 lead (Clinic Performed) (Completed)    TSH with reflex to Free T4 if abnormal    Magnesium    Irregular heart rhythm - Primary         Rafal Ashraf,      This medical note was created with the assistance of artificial intelligence (AI) for documentation purposes. The content has been reviewed and confirmed by the healthcare provider for accuracy and completeness. Patient consented to the use of audio recording and use of AI during  their visit.

## 2025-07-08 ENCOUNTER — TELEPHONE (OUTPATIENT)
Dept: PRIMARY CARE | Facility: CLINIC | Age: 80
End: 2025-07-08
Payer: MEDICARE

## 2025-07-08 NOTE — TELEPHONE ENCOUNTER
Patient aware and verbalized understanding.  He is familiar with A fib because his wife has it.  I did explain to start the Eliquis ASAP and to make an appointment with Dr Harish DUGGAN.  He declined to schedule the Holter monitor at this time.  He said he had a a lot going on and will need to call back.  I did explain the importance of having this done.  He understood.

## 2025-07-08 NOTE — TELEPHONE ENCOUNTER
Patient was seen yesterday in the office and had abnormal EKG I did review it with Dr. Moreira the cardiologist he plans on establishing care with who reviewed the EKG after we send that to him. Dr. Moreira felt that the EKG showed atrial fibrillation which is a arrhythmia of the top chambers of the heart which can predispose someone to stroke. The treatment for this is to be on a blood thinner to prevent strokes. Dr. Moreira recommended that we start the patient on Eliquis which is a blood thinner at least until he is seen by cardiology. I have went ahead and ordered this medication to the patient's pharmacy and Dr. Moreira also recommended a 48 hours Holter monitor to see if he is in A-fib and how often he goes into this arrhythmia.

## 2025-07-10 LAB
NON-UH HIE MAGNESIUM: 2.2 MG/DL (ref 1.6–2.6)
NON-UH HIE TSH: 2.41 UIU/ML (ref 0.55–4.78)

## 2025-07-14 ENCOUNTER — TELEPHONE (OUTPATIENT)
Dept: PRIMARY CARE | Facility: CLINIC | Age: 80
End: 2025-07-14
Payer: MEDICARE

## 2025-07-14 NOTE — TELEPHONE ENCOUNTER
Patient called and left a voice mail asking for Apixaban be sent in.  Called and confirmed with [facility] that Eliquis is not generic yet.  He verbalized understanding

## 2025-07-27 DIAGNOSIS — M1A.9XX0 CHRONIC GOUT WITHOUT TOPHUS, UNSPECIFIED CAUSE, UNSPECIFIED SITE: ICD-10-CM

## 2025-07-27 DIAGNOSIS — B35.4 TINEA CORPORIS: ICD-10-CM

## 2025-07-29 RX ORDER — ALLOPURINOL 300 MG/1
300 TABLET ORAL DAILY
Qty: 90 TABLET | Refills: 3 | Status: SHIPPED | OUTPATIENT
Start: 2025-07-29

## 2025-07-29 RX ORDER — NYSTATIN 100000 U/G
CREAM TOPICAL
Qty: 30 G | Refills: 3 | Status: SHIPPED | OUTPATIENT
Start: 2025-07-29

## 2026-01-05 ENCOUNTER — APPOINTMENT (OUTPATIENT)
Dept: PRIMARY CARE | Facility: CLINIC | Age: 81
End: 2026-01-05
Payer: MEDICARE